# Patient Record
Sex: FEMALE | Race: WHITE
[De-identification: names, ages, dates, MRNs, and addresses within clinical notes are randomized per-mention and may not be internally consistent; named-entity substitution may affect disease eponyms.]

---

## 2017-07-21 ENCOUNTER — HOSPITAL ENCOUNTER (OUTPATIENT)
Dept: HOSPITAL 47 - RADMAMWWP | Age: 67
Discharge: HOME | End: 2017-07-21
Payer: MEDICARE

## 2017-07-21 DIAGNOSIS — Z12.31: Primary | ICD-10-CM

## 2017-07-24 NOTE — MM
Reason for exam: screening  (asymptomatic).

Last mammogram was performed 1 year and 2 months ago.



History:

Patient is postmenopausal.

Family history of breast cancer in sister at age 59.

Cyst aspiration of the left breast.  2 cyst aspirations of the right breast.



Physical Findings:

A clinical breast exam by your physician is recommended on an annual basis and 

results should be correlated with mammographic findings.



MG Screening Mammo w CAD

Bilateral CC and MLO view(s) were taken.

Prior study comparison: May 23, 2016, bilateral MG screening mammo w CAD.  May 19,

2015, bilateral MG screening mammo w CAD.  April 29, 2014, bilateral digital 

screening mammo w/CAD.

The breast tissue is extremely dense which could obscure a lesion on mammography.

Finding: There are typically benign round calcifications in both breasts. 

Asymmetric breast tissue in the left breast, stable. There is no discrete 

abnormality.





ASSESSMENT: Benign, BI-RAD 2



RECOMMENDATION:

Routine screening mammogram of both breasts in 1 year.

## 2018-08-28 ENCOUNTER — HOSPITAL ENCOUNTER (OUTPATIENT)
Dept: HOSPITAL 47 - RADMAMWWP | Age: 68
Discharge: HOME | End: 2018-08-28
Attending: OBSTETRICS & GYNECOLOGY
Payer: MEDICARE

## 2018-08-28 DIAGNOSIS — Z12.31: Primary | ICD-10-CM

## 2018-08-28 DIAGNOSIS — M85.80: ICD-10-CM

## 2018-08-28 PROCEDURE — 77063 BREAST TOMOSYNTHESIS BI: CPT

## 2018-08-28 PROCEDURE — 77080 DXA BONE DENSITY AXIAL: CPT

## 2018-08-28 PROCEDURE — 77067 SCR MAMMO BI INCL CAD: CPT

## 2018-08-28 NOTE — BD
EXAMINATION TYPE: Axial Bone Density

 

DATE OF EXAM: 8/28/2018

 

COMPARISON: 05.19.2015

 

CLINICAL HISTORY: 68 YR OLD FEMALE.....ICD-10  CODE:      M85.9,   Z13.820

 

Height:  60.8

Weight:  122

 

FRAX RISK QUESTIONS:

Family History (Parent hip fracture):  YES

 

RISK FACTORS 

HISTORY OF: 

Family History of Osteoporosis: YES, BOTH PARENTS, MOTHER WITH BROKEN HIP

Active: YES

Diet low in dairy products/other sources of calcium:  NO

Postmenopausal woman: 51 YRS OLD

 

MEDICATIONS: 

Additional Medications: STATINS FOR CHOLESTEROL 

Additional History: NOTHING TO NOTE

 

 

EXAM MEASUREMENTS: 

Bone mineral densitometry was performed using the [employer] System.

Bone mineral density as measured about the Lumbar spine is:  

----- L1-L4(G/cm2): 1.302

T Score Values are as follows:

----- L1:  0.0

----- L2: 1.1

----- L3: 1.2

----- L4: 1.3

----- L1-L4: 1.0

Bone mineral density has: REMAINED THE SAME 0.0% since study of: 05.19.2015

 

Bone mineral density about the R hip (g/cm2): 0.994

Bone mineral density about the L hip (g/cm2): 0.810

T Score values are as follows:

-----R Neck: 0.3

-----L Neck: -2.1

-----R Total: -0.1

-----L Total: -1.6

Bone mineral density has: Decreased -3.8% since study of: 05.19.2015

 

FRAX%s:    THERE IS A 19.4% CHANCE FOR A MAJOR OSTEOPOROTIC FX AND A 3.6% FOR A HIP FX.....PROBABILIT
Y OF FX IN 10 YRS TIME

 

IMPRESSION:

Osteopenia (T Score between -2.5 and -1).

 

There is slightly increased risk of fracture and the patient may be considered 

for treatment. 

 

Re-Screen 2-5 years.

 

 

 

 

 

NOTE:  T-SCORE=SD OF THE YOUNG ADULT MEAN.

## 2018-08-29 NOTE — MM
Reason for exam: screening  (asymptomatic).

Last mammogram was performed 1 year and 1 month ago.



History:

Patient is postmenopausal.

Family history of breast cancer in sister at age 59.

Cyst aspiration of the left breast.  2 cyst aspirations of the right breast.



Physical Findings:

A clinical breast exam by your physician is recommended on an annual basis and 

results should be correlated with mammographic findings.



MG 3D Screening Mammo W/Cad

Bilateral CC and MLO view(s) were taken.

Prior study comparison: July 21, 2017, bilateral MG screening mammo w CAD.  May 

23, 2016, bilateral MG screening mammo w CAD.

The breast tissue is extremely dense which could obscure a lesion on mammography. 

No significant changes when compared with prior studies.





ASSESSMENT: Benign, BI-RAD 2



RECOMMENDATION:

Routine screening mammogram of both breasts in 1 year.

## 2019-09-17 ENCOUNTER — HOSPITAL ENCOUNTER (OUTPATIENT)
Dept: HOSPITAL 47 - RADMAMWWP | Age: 69
Discharge: HOME | End: 2019-09-17
Attending: OBSTETRICS & GYNECOLOGY
Payer: MEDICARE

## 2019-09-17 DIAGNOSIS — Z12.31: Primary | ICD-10-CM

## 2019-09-17 DIAGNOSIS — Z80.3: ICD-10-CM

## 2019-09-17 PROCEDURE — 77067 SCR MAMMO BI INCL CAD: CPT

## 2019-09-17 PROCEDURE — 77063 BREAST TOMOSYNTHESIS BI: CPT

## 2019-09-18 NOTE — MM
Reason for exam: screening  (asymptomatic).

Last mammogram was performed 1 year and 1 month ago.



History:

Patient is postmenopausal.

Family history of breast cancer in sister at age 59.

Cyst aspiration of the left breast.  2 cyst aspirations of the right breast.



Physical Findings:

A clinical breast exam by your physician is recommended on an annual basis and 

results should be correlated with mammographic findings.



MG 3D Screening Mammo W/Cad

Bilateral CC and MLO view(s) were taken.

Prior study comparison: August 28, 2018, bilateral MG 3d screening mammo w/cad.  

July 21, 2017, bilateral MG screening mammo w CAD.

The breast tissue is extremely dense which could obscure a lesion on mammography. 

There are benign appearing round calcifications bilaterally. There is no discrete

abnormality.





ASSESSMENT: Benign, BI-RAD 2



RECOMMENDATION:

Routine screening mammogram of both breasts in 1 year.

## 2019-10-29 ENCOUNTER — HOSPITAL ENCOUNTER (EMERGENCY)
Dept: HOSPITAL 47 - EC | Age: 69
Discharge: OTHER INSTITUTION | End: 2019-10-29
Payer: MEDICARE

## 2019-10-29 VITALS — HEART RATE: 84 BPM | DIASTOLIC BLOOD PRESSURE: 62 MMHG | SYSTOLIC BLOOD PRESSURE: 130 MMHG

## 2019-10-29 VITALS — TEMPERATURE: 98.7 F

## 2019-10-29 VITALS — RESPIRATION RATE: 18 BRPM

## 2019-10-29 DIAGNOSIS — E78.5: ICD-10-CM

## 2019-10-29 DIAGNOSIS — Z79.899: ICD-10-CM

## 2019-10-29 DIAGNOSIS — R51: ICD-10-CM

## 2019-10-29 DIAGNOSIS — R42: Primary | ICD-10-CM

## 2019-10-29 LAB
ALBUMIN SERPL-MCNC: 4.7 G/DL (ref 3.5–5)
ALP SERPL-CCNC: 84 U/L (ref 38–126)
ALT SERPL-CCNC: 29 U/L (ref 9–52)
ANION GAP SERPL CALC-SCNC: 8 MMOL/L
APTT BLD: 25.6 SEC (ref 22–30)
AST SERPL-CCNC: 30 U/L (ref 14–36)
BASOPHILS # BLD AUTO: 0 K/UL (ref 0–0.2)
BASOPHILS NFR BLD AUTO: 0 %
BUN SERPL-SCNC: 12 MG/DL (ref 7–17)
CALCIUM SPEC-MCNC: 10 MG/DL (ref 8.4–10.2)
CHLORIDE SERPL-SCNC: 104 MMOL/L (ref 98–107)
CO2 SERPL-SCNC: 27 MMOL/L (ref 22–30)
EOSINOPHIL # BLD AUTO: 0.1 K/UL (ref 0–0.7)
EOSINOPHIL NFR BLD AUTO: 1 %
ERYTHROCYTE [DISTWIDTH] IN BLOOD BY AUTOMATED COUNT: 5.23 M/UL (ref 3.8–5.4)
ERYTHROCYTE [DISTWIDTH] IN BLOOD: 13.9 % (ref 11.5–15.5)
GLUCOSE SERPL-MCNC: 108 MG/DL (ref 74–99)
HCT VFR BLD AUTO: 42.6 % (ref 34–46)
HGB BLD-MCNC: 13.6 GM/DL (ref 11.4–16)
INR PPP: 0.9 (ref ?–1.2)
KETONES UR QL STRIP.AUTO: (no result)
LYMPHOCYTES # SPEC AUTO: 1.1 K/UL (ref 1–4.8)
LYMPHOCYTES NFR SPEC AUTO: 14 %
MAGNESIUM SPEC-SCNC: 2.1 MG/DL (ref 1.6–2.3)
MCH RBC QN AUTO: 26.1 PG (ref 25–35)
MCHC RBC AUTO-ENTMCNC: 32 G/DL (ref 31–37)
MCV RBC AUTO: 81.6 FL (ref 80–100)
MONOCYTES # BLD AUTO: 0.2 K/UL (ref 0–1)
MONOCYTES NFR BLD AUTO: 3 %
NEUTROPHILS # BLD AUTO: 6.2 K/UL (ref 1.3–7.7)
NEUTROPHILS NFR BLD AUTO: 81 %
PH UR: 6.5 [PH] (ref 5–8)
PLATELET # BLD AUTO: 295 K/UL (ref 150–450)
POTASSIUM SERPL-SCNC: 4.4 MMOL/L (ref 3.5–5.1)
PROT SERPL-MCNC: 8 G/DL (ref 6.3–8.2)
PT BLD: 10.1 SEC (ref 9–12)
RBC UR QL: 3 /HPF (ref 0–5)
SODIUM SERPL-SCNC: 139 MMOL/L (ref 137–145)
SP GR UR: 1 (ref 1–1.03)
SQUAMOUS UR QL AUTO: 1 /HPF (ref 0–4)
UROBILINOGEN UR QL STRIP: <2 MG/DL (ref ?–2)
WBC # BLD AUTO: 7.6 K/UL (ref 3.8–10.6)

## 2019-10-29 PROCEDURE — 83735 ASSAY OF MAGNESIUM: CPT

## 2019-10-29 PROCEDURE — 80053 COMPREHEN METABOLIC PANEL: CPT

## 2019-10-29 PROCEDURE — 93005 ELECTROCARDIOGRAM TRACING: CPT

## 2019-10-29 PROCEDURE — 96374 THER/PROPH/DIAG INJ IV PUSH: CPT

## 2019-10-29 PROCEDURE — 81001 URINALYSIS AUTO W/SCOPE: CPT

## 2019-10-29 PROCEDURE — 96361 HYDRATE IV INFUSION ADD-ON: CPT

## 2019-10-29 PROCEDURE — 36415 COLL VENOUS BLD VENIPUNCTURE: CPT

## 2019-10-29 PROCEDURE — 85025 COMPLETE CBC W/AUTO DIFF WBC: CPT

## 2019-10-29 PROCEDURE — 99285 EMERGENCY DEPT VISIT HI MDM: CPT

## 2019-10-29 PROCEDURE — 85652 RBC SED RATE AUTOMATED: CPT

## 2019-10-29 PROCEDURE — 85610 PROTHROMBIN TIME: CPT

## 2019-10-29 PROCEDURE — 70450 CT HEAD/BRAIN W/O DYE: CPT

## 2019-10-29 PROCEDURE — 85730 THROMBOPLASTIN TIME PARTIAL: CPT

## 2019-10-29 NOTE — ED
General Adult HPI





- General


Chief complaint: Dizziness


Stated complaint: dizziness, nausea, headache sent by medex


Time Seen by Provider: 10/29/19 11:19


Source: patient, family, RN notes reviewed


Mode of arrival: wheelchair


Limitations: no limitations





- History of Present Illness


Initial comments: 





Patient is a pleasant 69-year-old female presenting to the emergency department 

with complaints of headache and lightheadedness.  Onset of symptoms was 2 days 

ago.  Headache was gradual onset.  Headache is moderate rated 5 or 6/10.  

Headache is not severe.  Patient does have some mild nausea and did have a 

little bit of emesis earlier today.  Patient states headache was not sudden or 

thunderclap onset.  Patient states headache is not severe.  Patient has had 

associated dizziness.  Dizziness is described as lightheadedness.  Dizziness 

does worsen with upright position and movements.  No confusion.Pounds.  No 

weakness.  No history of similar symptoms previously.





- Related Data


                                Home Medications











 Medication  Instructions  Recorded  Confirmed


 


Acetaminophen Tab [Tylenol Tab] 1,000 mg PO Q6HR PRN 10/29/19 10/29/19


 


Atorvastatin [Lipitor] 10 mg PO HS 10/29/19 10/29/19








                                  Previous Rx's











 Medication  Instructions  Recorded


 


Metoclopramide HCl [Reglan] 10 mg PO Q6HR PRN #15 tablet 10/29/19











                                    Allergies











Allergy/AdvReac Type Severity Reaction Status Date / Time


 


No Known Allergies Allergy   Verified 10/29/19 12:04














Review of Systems


ROS Statement: 


Those systems with pertinent positive or pertinent negative responses have been 

documented in the HPI.





ROS Other: All systems not noted in ROS Statement are negative.


Constitutional: Denies: fever


Eyes: Denies: eye pain


ENT: Denies: ear pain


Respiratory: Denies: cough


Cardiovascular: Denies: chest pain


Endocrine: Denies: fatigue


Gastrointestinal: Reports: nausea.  Denies: abdominal pain


Genitourinary: Denies: dysuria


Musculoskeletal: Denies: back pain


Skin: Denies: rash


Neurological: Reports: as per HPI, headache.  Denies: weakness, confusion





Past Medical History


Past Medical History: Hyperlipidemia


History of Any Multi-Drug Resistant Organisms: None Reported


Past Surgical History: No Surgical Hx Reported


Past Psychological History: No Psychological Hx Reported


Smoking Status: Never smoker


Past Alcohol Use History: None Reported


Past Drug Use History: None Reported





General Exam


Limitations: no limitations


General appearance: alert, in no apparent distress


Head exam: Present: atraumatic, normocephalic


Eye exam: Present: normal appearance, PERRL, EOMI.  Absent: nystagmus


ENT exam: Present: normal oropharynx


Neck exam: Present: normal inspection


Respiratory exam: Present: normal lung sounds bilaterally


Cardiovascular Exam: Present: regular rate, normal rhythm


GI/Abdominal exam: Present: soft.  Absent: tenderness


Extremities exam: Present: normal inspection.  Absent: pedal edema, calf 

tenderness


Neurological exam: Present: alert, oriented X3, CN II-XII intact.  Absent: motor

 sensory deficit


  ** Expanded


Neurological exam: Present: protecting the airway


Patient oriented to: Present: person, place, time


Speech: Present: fluid speech


Cranial nerves: EOM's Intact: Normal, Facial Sensation: Normal


Cerebellar function: Finger to Nose: Normal


Sensory exam: Upper Extremity Light Touch: Normal, Lower Extremity Light Touch: 

Normal


Motor strength exam: RUE: 5, LUE: 5, RLE: 5, LLE: 5


Eye Response: (4) open spontaneously


Motor Response: (6) obeys commands


Verbal Response: (5) oriented


Psychiatric exam: Present: normal affect, normal mood


Skin exam: Present: normal color





Course


                                   Vital Signs











  10/29/19 10/29/19 10/29/19





  11:01 12:16 14:24


 


Temperature 97.7 F  


 


Pulse Rate 100 90 89


 


Respiratory 18 18 18





Rate   


 


Blood Pressure 147/72 143/58 132/64


 


O2 Sat by Pulse 100 98 97





Oximetry   














EKG Findings





- EKG Comments:


EKG Findings:: Normal sinus rhythm at 86.  .  QRS 84.  .  .  

Normal axis.  Normal QRS.  No acute ST change.





Medical Decision Making





- Medical Decision Making





Patient reevaluated and resting comfortably in bed.  Patient states headache is 

near resolved.  Patient states lightheadedness is significantly improved.  

Patient does request discharge home.  Patient states she is somewhat drowsy.  

Patient denies any medication at this point stating not necessary.  Patient is 

receptive to over-the-counter Antivert.  Patient originally stated she did not 

want a prescription however is receptive to receiving it even if she does not 

use it.  Patient states she was able to get up and use the restroom without 

difficulty.





- Lab Data


Result diagrams: 


                                 10/29/19 12:05





                                 10/29/19 12:05


                                   Lab Results











  10/29/19 10/29/19 10/29/19 Range/Units





  12:05 12:05 12:05 


 


WBC  7.6    (3.8-10.6)  k/uL


 


RBC  5.23    (3.80-5.40)  m/uL


 


Hgb  13.6    (11.4-16.0)  gm/dL


 


Hct  42.6    (34.0-46.0)  %


 


MCV  81.6    (80.0-100.0)  fL


 


MCH  26.1    (25.0-35.0)  pg


 


MCHC  32.0    (31.0-37.0)  g/dL


 


RDW  13.9    (11.5-15.5)  %


 


Plt Count  295    (150-450)  k/uL


 


Neutrophils %  81    %


 


Lymphocytes %  14    %


 


Monocytes %  3    %


 


Eosinophils %  1    %


 


Basophils %  0    %


 


Neutrophils #  6.2    (1.3-7.7)  k/uL


 


Lymphocytes #  1.1    (1.0-4.8)  k/uL


 


Monocytes #  0.2    (0-1.0)  k/uL


 


Eosinophils #  0.1    (0-0.7)  k/uL


 


Basophils #  0.0    (0-0.2)  k/uL


 


ESR  9    (0-20)  mm/hr


 


PT    10.1  (9.0-12.0)  sec


 


INR    0.9  (<1.2)  


 


APTT    25.6  (22.0-30.0)  sec


 


Sodium   139   (137-145)  mmol/L


 


Potassium   4.4   (3.5-5.1)  mmol/L


 


Chloride   104   ()  mmol/L


 


Carbon Dioxide   27   (22-30)  mmol/L


 


Anion Gap   8   mmol/L


 


BUN   12   (7-17)  mg/dL


 


Creatinine   0.75   (0.52-1.04)  mg/dL


 


Est GFR (CKD-EPI)AfAm   >90   (>60 ml/min/1.73 sqM)  


 


Est GFR (CKD-EPI)NonAf   82   (>60 ml/min/1.73 sqM)  


 


Glucose   108 H   (74-99)  mg/dL


 


Calcium   10.0   (8.4-10.2)  mg/dL


 


Magnesium   2.1   (1.6-2.3)  mg/dL


 


Total Bilirubin   0.5   (0.2-1.3)  mg/dL


 


AST   30   (14-36)  U/L


 


ALT   29   (9-52)  U/L


 


Alkaline Phosphatase   84   ()  U/L


 


Total Protein   8.0   (6.3-8.2)  g/dL


 


Albumin   4.7   (3.5-5.0)  g/dL


 


Urine Color     


 


Urine Appearance     (Clear)  


 


Urine pH     (5.0-8.0)  


 


Ur Specific Gravity     (1.001-1.035)  


 


Urine Protein     (Negative)  


 


Urine Glucose (UA)     (Negative)  


 


Urine Ketones     (Negative)  


 


Urine Blood     (Negative)  


 


Urine Nitrite     (Negative)  


 


Urine Bilirubin     (Negative)  


 


Urine Urobilinogen     (<2.0)  mg/dL


 


Ur Leukocyte Esterase     (Negative)  


 


Urine RBC     (0-5)  /hpf


 


Urine WBC     (0-5)  /hpf


 


Ur Squamous Epith Cells     (0-4)  /hpf


 


Urine Mucus     (None)  /hpf














  10/29/19 Range/Units





  13:22 


 


WBC   (3.8-10.6)  k/uL


 


RBC   (3.80-5.40)  m/uL


 


Hgb   (11.4-16.0)  gm/dL


 


Hct   (34.0-46.0)  %


 


MCV   (80.0-100.0)  fL


 


MCH   (25.0-35.0)  pg


 


MCHC   (31.0-37.0)  g/dL


 


RDW   (11.5-15.5)  %


 


Plt Count   (150-450)  k/uL


 


Neutrophils %   %


 


Lymphocytes %   %


 


Monocytes %   %


 


Eosinophils %   %


 


Basophils %   %


 


Neutrophils #   (1.3-7.7)  k/uL


 


Lymphocytes #   (1.0-4.8)  k/uL


 


Monocytes #   (0-1.0)  k/uL


 


Eosinophils #   (0-0.7)  k/uL


 


Basophils #   (0-0.2)  k/uL


 


ESR   (0-20)  mm/hr


 


PT   (9.0-12.0)  sec


 


INR   (<1.2)  


 


APTT   (22.0-30.0)  sec


 


Sodium   (137-145)  mmol/L


 


Potassium   (3.5-5.1)  mmol/L


 


Chloride   ()  mmol/L


 


Carbon Dioxide   (22-30)  mmol/L


 


Anion Gap   mmol/L


 


BUN   (7-17)  mg/dL


 


Creatinine   (0.52-1.04)  mg/dL


 


Est GFR (CKD-EPI)AfAm   (>60 ml/min/1.73 sqM)  


 


Est GFR (CKD-EPI)NonAf   (>60 ml/min/1.73 sqM)  


 


Glucose   (74-99)  mg/dL


 


Calcium   (8.4-10.2)  mg/dL


 


Magnesium   (1.6-2.3)  mg/dL


 


Total Bilirubin   (0.2-1.3)  mg/dL


 


AST   (14-36)  U/L


 


ALT   (9-52)  U/L


 


Alkaline Phosphatase   ()  U/L


 


Total Protein   (6.3-8.2)  g/dL


 


Albumin   (3.5-5.0)  g/dL


 


Urine Color  Colorless  


 


Urine Appearance  Clear  (Clear)  


 


Urine pH  6.5  (5.0-8.0)  


 


Ur Specific Gravity  1.005  (1.001-1.035)  


 


Urine Protein  Negative  (Negative)  


 


Urine Glucose (UA)  Negative  (Negative)  


 


Urine Ketones  1+ H  (Negative)  


 


Urine Blood  Small H  (Negative)  


 


Urine Nitrite  Negative  (Negative)  


 


Urine Bilirubin  Negative  (Negative)  


 


Urine Urobilinogen  <2.0  (<2.0)  mg/dL


 


Ur Leukocyte Esterase  Negative  (Negative)  


 


Urine RBC  3  (0-5)  /hpf


 


Urine WBC  1  (0-5)  /hpf


 


Ur Squamous Epith Cells  1  (0-4)  /hpf


 


Urine Mucus  Rare H  (None)  /hpf














- Radiology Data


Radiology results: image reviewed (Computed tomography scan of brain shows no 

acute process)





Disposition


Clinical Impression: 


 Dizziness, Headache





Disposition: ADMITTED AS IP TO THIS [facility]


Condition: Stable


Instructions (If sedation given, give patient instructions):  Dizziness (ED), 

Acute Headache (ED)


Additional Instructions: 


Please follow-up with primary care physician in the next day or 2 for recheck.  

Return for weakness, confusion, speech problems, increased dizziness, worsening 

symptoms or any other concerns.  Please also follow-up with your ENT.  

Over-the-counter Antivert as needed.





Reglan prescription has been sent to [facility] pharmacy











Prescriptions: 


Metoclopramide HCl [Reglan] 10 mg PO Q6HR PRN #15 tablet


 PRN Reason: Nausea


Is patient prescribed a controlled substance at d/c from ED?: No


Referrals: 


Yancy Ledbetter MD [Primary Care Provider] - 1-2 days


Damien Peña MD [STAFF PHYSICIAN] - 1-2 days


Ricky Chapin MD [Medical Doctor] - 1-2 days


Time of Disposition: 14:42

## 2019-10-29 NOTE — CT
EXAMINATION TYPE: CT brain wo con

 

DATE OF EXAM: 10/29/2019

 

COMPARISON: None

 

HISTORY: 69-year-old female headache and dizziness

 

TECHNIQUE:  Examination was done in axial plane without intravenous contrast.  Coronal and sagittal r
econstructions performed.

 

CT DLP: 1056.4 mGycm

Automated exposure control for dose reduction was used.

 

FINDINGS:

There is no evidence of  acute intracranial hemorrhage, acute ischemic changes, mass, mass-effect, or
 extra-axial fluid collection.  There is no effacement of cerebral sulci or basal subarachnoid cister
ns.  There is no hydrocephalus.  There is no midline shift.  Gray-white matter distinction is preserv
ed.

 

Paranasal sinuses and mastoid air cells well pneumatized. Orbits and globes are intact.

 

 

IMPRESSION:

No acute intracranial abnormality seen.

## 2021-03-19 ENCOUNTER — HOSPITAL ENCOUNTER (OUTPATIENT)
Dept: HOSPITAL 47 - RADMAMWWP | Age: 71
Discharge: HOME | End: 2021-03-19
Attending: FAMILY MEDICINE
Payer: MEDICARE

## 2021-03-19 DIAGNOSIS — M81.0: ICD-10-CM

## 2021-03-19 DIAGNOSIS — Z80.3: ICD-10-CM

## 2021-03-19 DIAGNOSIS — Z78.0: ICD-10-CM

## 2021-03-19 DIAGNOSIS — Z12.31: Primary | ICD-10-CM

## 2021-03-19 PROCEDURE — 77063 BREAST TOMOSYNTHESIS BI: CPT

## 2021-03-19 PROCEDURE — 77067 SCR MAMMO BI INCL CAD: CPT

## 2021-03-19 PROCEDURE — 77080 DXA BONE DENSITY AXIAL: CPT

## 2021-03-19 NOTE — BD
EXAMINATION TYPE: Axial Bone Density

 

DATE OF EXAM: 3/19/2021

 

COMPARISON: 2018

 

CLINICAL HISTORY: Postmenopausal female.

 

Height:  60.5 IN

Weight:  127 LBS

 

FRAX RISK QUESTIONS:

Family History (Parent hip fracture):  YES MOTHER

 

RISK FACTORS 

HISTORY OF: 

Family History of Osteoporosis: MOTHER AND FATHER

Active: YES

Postmenopausal woman: AGE 50

 

MEDICATIONS: 

Osteoporosis Medications: NOT NOW

Which medication:  Actonel

How Lon YEARS

Additional Medications: ATORVASTATIN 

 

 

 

EXAM MEASUREMENTS: 

Bone mineral densitometry was performed using the [employer] System.

Bone mineral density as measured about the Lumbar spine is:  

----- L1-L4(G/cm2): 1.327

T Score Values are as follows:

----- L2: 1.6

----- L3: 1.4

----- L4: 1.5

----- L1-L4: 1.2

Bone mineral density has: Increased 2.2% since study of: 2018

 

Bone mineral density about the R hip (g/cm2): 0.865

Bone mineral density about the L hip (g/cm2): 0.683

T Score values are as follows:

-----R Neck: -1.2

-----L Neck: -2.6

-----R Total: -0.3

-----L Total: -1.4

Bone mineral density has: Decreased -0.7% since study of: 2018

 

 

IMPRESSION:

Osteoporosis (T Score less than -2.5) now present femoral neck level left hip.

 

There is increased fracture risk and therapy is usually indicated based on age.

 

Re-Screen 1-2 years.

 

NOTE:  T-SCORE=SD OF THE YOUNG ADULT MEAN.

## 2021-03-22 NOTE — MM
Reason for exam: screening  (asymptomatic).

Last mammogram was performed 1 year and 6 months ago.



History:

Patient is postmenopausal.

Family history of breast cancer in sister at age 59.

Cyst aspiration of the left breast.  2 cyst aspirations of the right breast.



Physical Findings:

A clinical breast exam by your physician is recommended on an annual basis and 

results should be correlated with mammographic findings.



MG 3D Screening Mammo W/Cad

Bilateral CC and MLO view(s) were taken.

Prior study comparison: September 17, 2019, bilateral MG 3d screening mammo w/cad.

August 28, 2018, bilateral MG 3d screening mammo w/cad.

The breast tissue is heterogeneously dense. This may lower the sensitivity of 

mammography.  Stable benign calcifications. There is no discrete abnormality.  No 

significant changes when compared with prior studies.





ASSESSMENT: Benign, BI-RAD 2



RECOMMENDATION:

Routine screening mammogram of both breasts in 1 year.

## 2021-04-14 ENCOUNTER — HOSPITAL ENCOUNTER (OUTPATIENT)
Dept: HOSPITAL 47 - ORWHC2ENDO | Age: 71
Discharge: HOME | End: 2021-04-14
Attending: SURGERY
Payer: MEDICARE

## 2021-04-14 VITALS — SYSTOLIC BLOOD PRESSURE: 127 MMHG | RESPIRATION RATE: 16 BRPM | DIASTOLIC BLOOD PRESSURE: 75 MMHG | HEART RATE: 95 BPM

## 2021-04-14 VITALS — BODY MASS INDEX: 23.6 KG/M2

## 2021-04-14 VITALS — TEMPERATURE: 98.2 F

## 2021-04-14 DIAGNOSIS — E78.5: ICD-10-CM

## 2021-04-14 DIAGNOSIS — Z98.42: ICD-10-CM

## 2021-04-14 DIAGNOSIS — Z98.41: ICD-10-CM

## 2021-04-14 DIAGNOSIS — Z98.890: ICD-10-CM

## 2021-04-14 DIAGNOSIS — K64.0: ICD-10-CM

## 2021-04-14 DIAGNOSIS — Z79.899: ICD-10-CM

## 2021-04-14 DIAGNOSIS — K57.90: ICD-10-CM

## 2021-04-14 DIAGNOSIS — Z12.11: Primary | ICD-10-CM

## 2021-04-14 PROCEDURE — 45378 DIAGNOSTIC COLONOSCOPY: CPT

## 2021-04-14 NOTE — P.PCN
Date of Procedure: 04/14/21


Description of Procedure: 











PREOPERATIVE DIAGNOSIS:


Colonoscopy screening.





POSTOPERATIVE DIAGNOSIS:


Colonoscopy screening.


Diverticulosis, scattered.





OPERATION:


Colonoscopy to the cecum, ileocecal valve and appendiceal orifice.





SURGEON: Dalia Younger MD.





ANESTHESIA: MAC.





INDICATIONS:


The patient is a 70-year-old female who presents for colonoscopy screening.  

Last colonoscopy over 12 years ago.  Benefits and risks were described and 

informed consent was obtained.





DESCRIPTION OF PROCEDURE:


The patient had undergone Suprep.  The patient had been brought into the 

operating room and laid in the left lateral decubitus position. After adequate 

intravenous sedation, the rectum was examined with 2% lidocaine jelly. No 

external hemorrhoids were encountered. The rectal tone was within normal limits.

No lesions were palpated in the rectal vault. An Olympus colonoscope was 

advanced until the cecum, ileocecal valve and appendiceal orifice were clearly 

viewed.  The prep was excellent. Scattered diverticulosis was encountered.  No 

colonic polyps were found.  No evidence of focal colitis was found. Retroflexion

of the scope demonstrated grade 1 internal hemorrhoids without active bleeding 

or inflammation. The colon was desufflated. The patient had tolerated the 

procedure well. 





Withdrawal time was over 6 minutes.





FINDINGS:


Aronchick preparation quality scale 1 (1-5)


Internal hemorrhoids, grade 1


No external prolapsed hemorrhoids.


No arteriovenous malformations.


No adenomatous polyps.


No focal colitis.


Moderate sigmoid diverticulosis.





RECOMMENDATIONS:


Lower endoscopy as needed. Cologaurd may be considered for future screening. 





Plan - Discharge Summary


Discharge Rx Participant: No


New Discharge Prescriptions: 


Continue


   Atorvastatin [Lipitor] 10 mg PO HS


   Acetaminophen Tab [Tylenol] 1,000 mg PO Q6HR PRN


     PRN Reason: Pain Or Fever > 100.5


Discharge Medication List





Acetaminophen Tab [Tylenol] 1,000 mg PO Q6HR PRN 10/29/19 [History]


Atorvastatin [Lipitor] 10 mg PO HS 10/29/19 [History]








Follow up Appointment(s)/Referral(s): 


Dalia Younger MD [STAFF PHYSICIAN] - As Needed


Patient Instructions/Handouts:  *Surgery [facility] - (Anesthesia) Endoscopy Discharge 

Instructions, Colonoscopy (DC), Diverticulosis Diet (GEN), Diverticulosis (DC)


Activity/Diet/Wound Care/Special Instructions: 


Colonoscopy as needed


Discharge Disposition: HOME SELF-CARE

## 2021-04-14 NOTE — P.GSHP
History of Present Illness


H&P Date: 04/14/21














CHIEF COMPLAINT: Colon screen





HISTORY OF PRESENT ILLNESS: The patient is a 70-year-old female who


presents for colon screen.  Lower endoscopy was offered for further evaluation 

and management.





PAST MEDICAL HISTORY: 


Please see list.





PAST SURGICAL HISTORY: 


Please see list.





MEDICATIONS: 


Please see list.





ALLERGIES:  Please see list. 





SOCIAL HISTORY: No illicit drug use





FAMILY HISTORY: No reports of Crohn disease or ulcerative colitis. 





REVIEW OF ORGAN SYSTEMS: 


CONSTITUTIONAL: No reports of fevers or chills.  





PHYSICAL EXAM: 


VITAL SIGNS:  Stable


GENERAL: Well-developed pleasant in no acute distress. 


HEENT: No scleral icterus. Extraocular movements grossly


intact. Moist buccal mucosa. 


NECK: Supple without lymphadenopathy. 


CHEST: Unlabored respirations. Equal bilateral excursions. 


CARDIOVASCULAR: Regular rate and rhythm. Distal 2+ pulses. 


ABDOMEN: Soft, nontender, nondistended. 


MUSCULOSKELETAL: No clubbing, cyanosis, or edema. 





ASSESSMENT: 


1.  Colon screen.





PLAN: 


1. Recommend proceeding with a lower endoscopy





Past Medical History


Past Medical History: Hyperlipidemia


History of Any Multi-Drug Resistant Organisms: None Reported


Past Surgical History: No Surgical Hx Reported, Hernia Repair


Additional Past Surgical History / Comment(s): richard elbows, arthroscopy, lt knee 

surgery, richard cataract


Past Anesthesia/Blood Transfusion Reactions: No Reported Reaction


Smoking Status: Never smoker





Medications and Allergies


                                Home Medications











 Medication  Instructions  Recorded  Confirmed  Type


 


Acetaminophen Tab [Tylenol Tab] 1,000 mg PO Q6HR PRN 10/29/19 04/14/21 History


 


Atorvastatin [Lipitor] 10 mg PO HS 10/29/19 04/14/21 History








                                    Allergies











Allergy/AdvReac Type Severity Reaction Status Date / Time


 


No Known Allergies Allergy   Verified 04/09/21 15:19














Surgical - Exam


                                   Vital Signs











Temp Pulse Resp BP Pulse Ox


 


 98.2 F   99   18   174/77   97 


 


 04/14/21 07:09  04/14/21 07:09  04/14/21 07:09  04/14/21 07:09  04/14/21 07:09

## 2023-10-04 ENCOUNTER — HOSPITAL ENCOUNTER (OUTPATIENT)
Dept: HOSPITAL 47 - RADMAMWWP | Age: 73
Discharge: HOME | End: 2023-10-04
Attending: FAMILY MEDICINE
Payer: MEDICARE

## 2023-10-04 DIAGNOSIS — M81.0: ICD-10-CM

## 2023-10-04 DIAGNOSIS — Z80.3: ICD-10-CM

## 2023-10-04 DIAGNOSIS — Z12.31: Primary | ICD-10-CM

## 2023-10-04 DIAGNOSIS — Z78.0: ICD-10-CM

## 2023-10-04 PROCEDURE — 77067 SCR MAMMO BI INCL CAD: CPT

## 2023-10-04 PROCEDURE — 77080 DXA BONE DENSITY AXIAL: CPT

## 2023-10-04 PROCEDURE — 77063 BREAST TOMOSYNTHESIS BI: CPT

## 2023-10-04 NOTE — BD
EXAMINATION TYPE: Axial Bone Density

 

DATE OF EXAM: 10/4/2023

 

CLINICAL HISTORY: 73 years old Female.  ICD-10 CODE: M85.88 OTHER DISORDER OF BONE

 

Height:  60.5

Weight:  114

 

FRAX RISK QUESTIONS:

Family History (Parent hip fracture):  yes, mother

History of Fracture in Adulthood: no

Secondary Osteoporosis: no

Rheumatoid Arthritis: no

 

 

RISK FACTORS 

HISTORY OF: 

Family History of Osteoporosis: yes, mother and father

Active: yes

Diet low in dairy products/other sources of calcium:  no

Postmenopausal woman: yes,50

Lost more than 2 inches in height since high school: no

Frequent falls: no

Poor Health: no

 

MEDICATIONS: 

Additional Medications: yes 

cholesterol 

 

 

 

EXAM MEASUREMENTS: 

Bone mineral densitometry was performed using the [employer].

Bone mineral density as measured about the Lumbar spine is:  

----- L1-L4(G/cm2): 1.368

T Score Values are as follows:

----- L1: 0.0

----- L2: 1.7

----- L3: 1.8

----- L4: 2.2

----- L1-L4: 1.6

 

Z Score Values are as follows:

----- L1: 2.2

----- L2: 3.8

----- L3: 3.9

----- L4: 4.3

----- L1-L4: 3.7

 

Bone mineral density has: Increased 3.1% since study of: 03/19/2021

 

Bone mineral density about the R hip (g/cm2): 0.970

Bone mineral density about the L hip (g/cm2): 0.793

T Score values are as follows:

-----R Neck: -0.9

-----L Neck: -2.9

-----R Total: -0.3

-----L Total: -1.7

 

Z Score values are as follows:

-----R Neck: 1.2

-----L Neck: -0.8

-----R Total: 1.6

-----L Total: 0.2

 

Bone mineral density has: Decreased -1.6% since study of: 03/19/2021

 

 

FRAX%s: The graph provided illustrates a 32.2% chance for a major osteoporotic fx and a 20.0% chance 
for the hips probability for fx in 10 years time.

 

 

 

 

IMPRESSION:

Osteopenia (T Score between -2.5 and -1).

 

There is slightly increased risk of fracture and the patient may be considered 

for treatment. 

 

Re-Screen 2-5 years.

 

NOTE:  T-SCORE=SD OF THE YOUNG ADULT MEAN.

## 2023-10-05 NOTE — MM
Reason for Exam: Screening  (asymptomatic). 

Last mammogram was performed 2 year(s) and 7 month(s) ago. 





Patient History: 

Menarche at age 14. First Full-Term Pregnancy at age 23. Postmenopausal. Cyst Aspiration on the

Right side. Cyst Aspiration on the Right side. Cyst Aspiration on the Left side.

Sister had breast cancer, age 59. 





Risk Values: 

Fay 5 year model risk: 3.1%.

NCI Lifetime model risk: 7.5%.





Prior Study Comparison: 

8/28/2018 Bilateral Screening Mammogram, [facility]. 9/17/2019 Bilateral Screening Mammogram, [facility].

3/19/2021 Bilateral Screening Mammogram, [facility]. 





Tissue Density: 

The breast tissue is extremely dense which could obscure a lesion on mammography.





Findings: 

Analyzed By CAD. 

There is no suspicious group of microcalcifications or new suspicious mass in either breast. 





Overall Assessment: Benign, BI-RAD 2





Management: 

Screening Mammogram of both breasts in 1 year.

.



Patient should continue monthly self-breast exams.  A clinical breast exam by your physician is

recommended on an annual basis.

This exam should not preclude additional follow-up of suspicious palpable abnormalities.



Note on Fay scores and lifetime risk:

1. A Fay score greater than 3% is considered moderate risk. If this is the case, consider

specialist referral to assess eligibility for a risk reducing agent.

2. If overall lifetime risk for the development of breast cancer is 20% or higher, the patient may

qualify for future screening with alternating mammogram and breast MRI.



Electronically signed and approved by: Leopold M. Fregoli, M.D. Radiologis

## 2024-10-25 ENCOUNTER — HOSPITAL ENCOUNTER (OUTPATIENT)
Dept: HOSPITAL 47 - RADMAMWWP | Age: 74
Discharge: HOME | End: 2024-10-25
Attending: FAMILY MEDICINE
Payer: MEDICARE

## 2024-10-25 PROCEDURE — 77063 BREAST TOMOSYNTHESIS BI: CPT

## 2024-10-25 PROCEDURE — 77067 SCR MAMMO BI INCL CAD: CPT

## 2024-10-28 NOTE — MM
Reason for Exam: Screening  (asymptomatic). 

Last screening mammogram was performed 12 month(s) ago.





Patient History: 

Menarche at age 14. First Full-Term Pregnancy at age 23. Postmenopausal. Cyst Aspiration on the

Right side. Cyst Aspiration on the Right side. Cyst Aspiration on the Left side.

Sister had breast cancer, age 59. 





Risk Values: 

Fay 5 year model risk: 3.1%.

NCI Lifetime model risk: 7.0%.





Prior Study Comparison: 

9/17/2019 Bilateral Screening Mammogram, [facility]. 3/19/2021 Bilateral Screening Mammogram, [facility].

10/4/2023 Bilateral MG 3D screening mammo w/cad, [facility]. 





Tissue Density: 

The breasts are heterogeneously dense, which may obscure small masses.





Findings: 

Analyzed By CAD. 

Unchanged asymmetric density lateral left cc view as well as other bilateral areas of asymmetric

density.

There is no suspicious group of microcalcifications or new suspicious mass in either breast. 





Overall Assessment: Benign, BI-RAD 2





Management: 

Screening Mammogram of both breasts in 1 year.

See note below in regards to patient's increased 5 year Fay score.



Patient should continue monthly self-breast exams.  A clinical breast exam by your physician is

recommended on an annual basis.

This exam should not preclude additional follow-up of suspicious palpable abnormalities.



Note on Fay scores and lifetime risk:

1. A Fay score greater than 3% is considered moderate risk. If this is the case, consider

specialist referral to assess eligibility for a risk reducing agent.

2. If overall lifetime risk for the development of breast cancer is 20% or higher, the patient may

qualify for future screening with alternating mammogram and breast MRI.









[facility], Workstation: [id number], 10/28/2024 6:08 PM.



Electronically signed and approved by: Omega Fraga M.D. Radiologist